# Patient Record
Sex: MALE | Race: WHITE | URBAN - METROPOLITAN AREA
[De-identification: names, ages, dates, MRNs, and addresses within clinical notes are randomized per-mention and may not be internally consistent; named-entity substitution may affect disease eponyms.]

---

## 2020-08-12 ENCOUNTER — LAB OUTSIDE AN ENCOUNTER (OUTPATIENT)
Dept: URBAN - METROPOLITAN AREA TELEHEALTH 2 | Facility: TELEHEALTH | Age: 70
End: 2020-08-12

## 2020-08-12 ENCOUNTER — OFFICE VISIT (OUTPATIENT)
Dept: URBAN - METROPOLITAN AREA TELEHEALTH 2 | Facility: TELEHEALTH | Age: 70
End: 2020-08-12
Payer: MEDICARE

## 2020-08-12 DIAGNOSIS — K51.00 CHRONIC ULCERATIVE ENTEROCOLITIS: ICD-10-CM

## 2020-08-12 DIAGNOSIS — R79.82 CRP ELEVATED: ICD-10-CM

## 2020-08-12 DIAGNOSIS — K21.9 GERD (GASTROESOPHAGEAL REFLUX DISEASE): ICD-10-CM

## 2020-08-12 PROCEDURE — 3017F COLORECTAL CA SCREEN DOC REV: CPT | Performed by: INTERNAL MEDICINE

## 2020-08-12 PROCEDURE — G8420 CALC BMI NORM PARAMETERS: HCPCS | Performed by: INTERNAL MEDICINE

## 2020-08-12 PROCEDURE — 1036F TOBACCO NON-USER: CPT | Performed by: INTERNAL MEDICINE

## 2020-08-12 PROCEDURE — G9622 NO UNHEAL ETOH USER: HCPCS | Performed by: INTERNAL MEDICINE

## 2020-08-12 PROCEDURE — G8482 FLU IMMUNIZE ORDER/ADMIN: HCPCS | Performed by: INTERNAL MEDICINE

## 2020-08-12 PROCEDURE — 99214 OFFICE O/P EST MOD 30 MIN: CPT | Performed by: INTERNAL MEDICINE

## 2020-08-12 PROCEDURE — G9903 PT SCRN TBCO ID AS NON USER: HCPCS | Performed by: INTERNAL MEDICINE

## 2020-08-12 PROCEDURE — G8427 DOCREV CUR MEDS BY ELIG CLIN: HCPCS | Performed by: INTERNAL MEDICINE

## 2020-08-12 RX ORDER — IRBESARTAN 150 MG/1
TAKE 1 TABLET (150 MG) BY ORAL ROUTE ONCE DAILY TABLET ORAL 1
Qty: 0 | Refills: 0 | Status: ACTIVE | COMMUNITY
Start: 1900-01-01 | End: 1900-01-01

## 2020-08-12 RX ORDER — BALSALAZIDE DISODIUM 750 MG/1
TAKE 9 CAPSULES BY ORAL ROUTE DAILY FOR 90 DAYS CAPSULE ORAL 1
Qty: 810 | Refills: 4 | Status: ACTIVE | COMMUNITY
Start: 2020-04-22 | End: 2021-07-16

## 2020-08-12 NOTE — HPI-OTHER HISTORIES
. Pt George is a 70 y/o individual with UC, currently on balsalazide (6 per day), here for f/u of his condition. . Patient is referred by Dr. Leila Rivera. . He was diagnosed with UC in 2007.  He was initially started on Lialda and then switched to balsalazide, both of which worked.  He was then on 6-MP, which caused low blood counts and discontinued in 2018 (took for about 2 weeks).    Has never been on steroids. . Previously on 12/20/2018, he reports 4 BM per day, semi-formed per day. He has no rectal bleeding.  He has occasional urgency, typically in the morning.  No abdominal pain.  Weight is steady.  We started on sulfasalazine. Previously on 2/1/2019, he reports that he had several side effects to sulfasalazine so stopped taking.  His BM is normal, no blood in stool.  Some heartburn. Previously on 4/9/2019, he has 4 BM per day, occasional urgency sxs.   Stopped taking zantac, and GERD has remained stable.  Takes medication as needed. . Previously on 10/14/2019, pt reports that he has 3 BM per day, formed in nature.  No urgency of BM.  Reflux under control. . Previously on 2/3/2020, pt reports that he has about 3 BM per day, no urgency.  No rectal bleeding.  Was found to have an inguinal hernia by his PCP, and is planned for surgical repair for this. . Previously on 4/22/2020, pt reports after his hernia surgery, he first had constipation and then diarrhea.  He started to see mucus in his stool as well.  In past 2 weeks, his BM has reduced to 3 (from 5).   .  Today on 8/12/2020, pt reports that he is back to normal; 2-3 BM per day, no mucus, nl stools.  Reflux is stable.  . Denies any joint pain.  Denies any rashes, eye pain or redness. . FH: Father with UC SH: ETOH, no cig; retired. . Labs 2/2020: hgb 13.4, wbc 7.7, CRP 5, ast 18, alt 10 10/2019: CRP 19, ESR 15, ast 20, Alt 15, Cr 0.8, Hgb 14.2 2/2019: CRP 17.5; ESR 33; Ast 22, Alt 13, Hgb 13.4 12/2018: ESR 32, CRP: 9.4, B12 644, TB-gold neg; Hep B neg; Ferritin 37, Hgb 14.1; Ast 26, Alt 15 6/2018: Hgb 13.4 . Colonoscopy 5/2019:  The transverse colon, hepatic flexure, ascending colon and cecum appeared normal.  Biopsies were taken with a cold forceps for histology.  Buchanan Score 0. The pathology specimen was placed into Bottle Number 2. An area of mildly erythematous mucosa was found in the descending colon.  This was biopsied with a cold forceps for histology.  Buchanan Score 0 to 1.  The pathology specimen was placed into Bottle Number 3.  - The transverse colon, hepatic flexure, ascending colon and cecum are normal.  Biopsied. - Erythematous mucosa in the descending colon.  Biopsied. - Erythematous mucosa in the rectum and in the recto-sigmoid colon.  Biopsied.  A. Gastric , Biopsy: -Mild chronic inactive gastritis.  -Alcian blue/PAS stain is negative for intestinal metaplasia.  -Giemsa stain is negative for Helicobacter pylori organisms; confirmed with immunohistochemical stain. B. Ascending Colon and Transverse Colon Ulcer, Biopsy: -Minimal architectural changes (see comment). -No evidence of active colitis. -No granulomas seen. -Negative for dysplasia or malignancy. C. Descending Colon Ulcer, Biopsy: -Minimal architectural changes (see comment). -No evidence of active colitis. -No granulomas seen. -Negative for dysplasia or malignancy. D. Recto-Sigmoid, Colon Ulcer, Biopsy: -Chronic focally active colitis, consistent with history of Ulcerative Colitis. -No granulomas, negative for dysplasia or malignancy. . 12/2018: The perianal and digital rectal examinations were normal. A diffuse area of mildly congested, erythematous, inflamed and vascular-pattern-decreased mucosa was found in the sigmoid colon and in the descending colon.  Biopsies were taken with a cold forceps for histology. The exam was otherwise without abnormality.  A. Descending Colon and Sigmoid Colon, Biopsy: -Moderate chronic active colitis, consistent with history of ulcerative colitis. -No pathogenic microorganisms, granulomas, or viral cytopathic effect. -No dysplasia or malignancy.  . 6/2018: Mild erythema in distal colon A. Random Colon , Biopsy: -Focal active colitis (see comment). -There is no evidence of dysplasia or malignancy.  . 11/2017: A. GE JUNCTION, BIOPSY: -Squamous mucosa with reflux associated changes. -Gastric cardia type mucosa with chronic inflammation and foveolar hyperplasia. -Alcian blue/PAS stain is negative for intestinal metaplasia. No fungus. -Giemsa stain is negative for Helicobacter pylori organisms. B. PROXIMAL COLON, BIOPSY: -Chronic active colitis, moderate, consistent with history of Ulcerative Colitis, see comment. -No granulomas, negative for dysplasia or malignancy. C. DISTAL COLON, BIOPSY: -Chronic active colitis, moderate, consistent with history of Ulcerative Colitis, see comment. -No granulomas, negative for dysplasia or malignancy. D. RECTUM, BIOPSY: -Chronic active colitis, moderate, consistent with history of Ulcerative Colitis, see comment. -No granulomas, negative for dysplasia or malignancy.   .

## 2020-08-12 NOTE — PHYSICAL EXAM NECK/THYROID:
Acute Care - Physical Therapy Initial Evaluation  [facility]     Patient Name: Akua Torres  : 1942  MRN: [id number]  Today's Date: 11/10/2017   Onset of Illness/Injury or Date of Surgery Date: 11/10/17  Date of Referral to PT: 11/10/17  Referring Physician: MD Donovan      Admit Date: 11/10/2017     Visit Dx:    ICD-10-CM ICD-9-CM   1. Impaired functional mobility, balance, gait, and endurance Z74.09 V49.89     Patient Active Problem List   Diagnosis   • Degenerative disc disease, lumbar   • Lumbar stenosis with neurogenic claudication   • History of lumbar fusion   • Spondylosis of lumbar region without myelopathy or radiculopathy   • Mild obesity   • Physical deconditioning   • Idiopathic gout   • Anxiety and depression   • Greater trochanteric bursitis of both hips   • Status post total bilateral knee replacement   • Back pain   • HTN (hypertension)   • HLD (hyperlipidemia)   • Prediabetes   • S/P lumbar spinal fusion   • Renal insufficiency     Past Medical History:   Diagnosis Date   • Anxiety    • Anxiety and depression    • Arthritis    • Elevated serum creatinine     SEES DR SMITH EVERY 6 MONTHS- NEPHROLOGIST    • Extremity pain    • GERD (gastroesophageal reflux disease)    • H/O bladder infections     JUST FINISHED MACROBID ON 17 FOR RECENT UTI   • Hypercholesteremia    • Hypertension    • Joint pain    • Low back pain    • Neck pain    • Rheumatic fever    • Wears glasses      Past Surgical History:   Procedure Laterality Date   • BACK SURGERY      LUMBAR PER DR THORNTON    • CARPAL TUNNEL RELEASE Left    • COLONOSCOPY     • FINGER SURGERY Right     3RD FINGER   • HEEL SPUR EXCISION Bilateral    • KNEE ARTHROSCOPY Bilateral    • REPLACEMENT TOTAL KNEE BILATERAL Bilateral           PT ASSESSMENT (last 72 hours)      PT Evaluation       11/10/17 1620 11/10/17 0718    Rehab Evaluation    Document Type evaluation  -MJ     Subjective Information agree to therapy;complains of;pain   normal appearance , without tenderness upon palpation , no deformities , trachea midline , Thyroid normal size , no thyroid nodules , no masses , no JVD , thyroid nontender -MJ     Patient Effort, Rehab Treatment good  -MJ     Symptoms Noted During/After Treatment fatigue  -MJ     General Information    Patient Profile Review yes  -MJ     Onset of Illness/Injury or Date of Surgery Date 11/10/17  -     Referring Physician MD Donovan  -MJ     General Observations Pt supine in bed, hemovac, lozano, IV, SCDs.  present  -MJ     Pertinent History Of Current Problem Pt presents for surgical management of back pain and dysfunction that failed to improve with conservative management  -MJ     Precautions/Limitations fall precautions;spinal precautions   hemovac  -MJ     Prior Level of Function min assist:;all household mobility;community mobility;gait;transfer;bed mobility   Pain increased with activity, RW for mobility  -MJ     Equipment Currently Used at Home walker, rolling;cane, straight;wheelchair;commode;shower chair  -MJ walker, rolling;cane, straight;wheelchair  -TS    Plans/Goals Discussed With patient and family;agreed upon  -MJ     Risks Reviewed patient and family:;LOB;increased discomfort  -MJ     Benefits Reviewed patient and family:;improve function;increase independence;increase strength;increase balance;decrease pain  -MJ     Barriers to Rehab previous functional deficit  -MJ     Living Environment    Lives With spouse  -MJ spouse  -TS    Living Arrangements house  -MJ house  -TS    Home Accessibility bed and bath on same level   walk-in shower  -MJ bed and bath on same level;no concerns  -TS    Stair Railings at Home  none  -TS    Type of Financial/Environmental Concern  none  -TS    Transportation Available  car;family or friend will provide  -TS    Clinical Impression    Date of Referral to PT 11/10/17  -MJ     PT Diagnosis s/p lumbar fusion  -MJ     Criteria for Skilled Therapeutic Interventions Met yes;treatment indicated  -MJ     Pain Assessment    Pain Assessment 0-10  -MJ     Pain Score 7  -MJ     Post Pain Score 0  -MJ     Pain Type Acute pain  -MJ     Pain  Location Back  -MJ     Pain Intervention(s) Repositioned;Ambulation/increased activity;Medication (See MAR)  -MJ     Cognitive Assessment/Intervention    Current Cognitive/Communication Assessment functional  -MJ     Orientation Status oriented x 4  -MJ     Follows Commands/Answers Questions 100% of the time;able to follow multi-step instructions;needs cueing  -MJ     Personal Safety mild impairment   cues for back precautions  -MJ     ROM (Range of Motion)    General ROM no range of motion deficits identified  -MJ     General ROM Detail for B LE; defer UE to OT  -MJ     MMT (Manual Muscle Testing)    General MMT Assessment lower extremity strength deficits identified  -MJ     Lower Extremity    Lower Ext Manual Muscle Testing Detail R LE grossly 4/5; L LE grossly 4+/5  -MJ     Bed Mobility, Assessment/Treatment    Bed Mobility, Assistive Device bed rails  -MJ     Bed Mob, Supine to Sit, [place] moderate assist (50% patient effort);verbal cues required  -MJ     Bed Mob, Sit to Supine, [place] minimum assist (75% patient effort);verbal cues required  -MJ     Bed Mobility, Safety Issues decreased use of arms for pushing/pulling;decreased use of legs for bridging/pushing  -MJ     Bed Mobility, Impairments strength decreased;pain  -MJ     Bed Mobility, Comment Pt performed log roll into/out of bed, verbal cues for sequencing. Assist with trunk for sidelying to sit and assist with legs for sit to sidelying  -MJ     Transfer Assessment/Treatment    Transfers, Sit-Stand [place] minimum assist (75% patient effort);verbal cues required  -MJ     Transfers, Stand-Sit [place] contact guard assist;verbal cues required  -MJ     Transfers, Sit-Stand-Sit, Assist Device rolling walker  -MJ     Transfer, Safety Issues sequencing ability decreased;step length decreased;weight-shifting ability decreased  -MJ     Transfer, Impairments strength decreased;pain  -MJ     Transfer, Comment Verbal cues to push up from bed  with UEs to stand and to reach back for bed to lower into sitting.  -     Gait Assessment/Treatment    Gait, [place] Level contact guard assist;verbal cues required  -     Gait, Assistive Device rolling walker  -     Gait, Distance (Feet) 120  -     Gait, Gait Pattern Analysis swing-through gait  -     Gait, Gait Deviations bilateral:;antalgic;nixon decreased;decreased heel strike;step length decreased;toe-to-floor clearance decreased;weight-shifting ability decreased  -     Gait, Safety Issues sequencing ability decreased;step length decreased;weight-shifting ability decreased  -     Gait, Impairments strength decreased;pain  -     Gait, Comment Pt demo step through gait pattern at very slow pace. Pt required frequent cues for upright posture and to clear feet from floor during swing. Cues to stay in middle of RW and to not twist back during turns. Mild improvement with cues for correction. Gait limited by pain and fatigue  -     Therapy Exercises    Bilateral Lower Extremities --   initiate POD #1  -     Sensory Assessment/Intervention    Light Touch --   denies numbness/tingling; can actively DF both ankles  -     Positioning and Restraints    Pre-Treatment Position in bed  -     Post Treatment Position bed  -MJ     In Bed notified nsg;supine;call light within reach;encouraged to call for assist;with family/caregiver;SCD pump applied  -       User Key  (r) = Recorded By, (t) = Taken By, (c) = Cosigned By    Initials Name Provider Type     Tona Block, RN Registered Nurse    CLAY Clements, SUSAN Physical Therapist          Physical Therapy Education     Title: PT OT SLP Therapies (Active)     Topic: Physical Therapy (Active)     Point: Mobility training (Done)    Learning Progress Summary    Learner Readiness Method Response Comment Documented by Status   Patient Acceptance ARPITA MCALLISTER,NR Reviewed back precautions, log roll, benefits of mobility  11/10/17 1789 Done   Significant  Other Acceptance ARPITA MCALLISTER NR Reviewed back precautions, log roll, benefits of mobility  11/10/17 1735 Done               Point: Body mechanics (Done)    Learning Progress Summary    Learner Readiness Method Response Comment Documented by Status   Patient Acceptance ARPITA MCALLISTERNR Reviewed back precautions, log roll, benefits of mobility  11/10/17 1735 Done   Significant Other Acceptance ARPITA MCALLISTERNR Reviewed back precautions, log roll, benefits of mobility  11/10/17 1735 Done               Point: Precautions (Done)    Learning Progress Summary    Learner Readiness Method Response Comment Documented by Status   Patient Acceptance ARPITA MCALLISTERNR Reviewed back precautions, log roll, benefits of mobility  11/10/17 1735 Done   Significant Other Acceptance ARPITA MCALLISTERNR Reviewed back precautions, log roll, benefits of mobility  11/10/17 1735 Done                      User Key     Initials Effective Dates Name Provider Type Discipline     03/14/16 -  Henrique Clements, PT Physical Therapist PT                PT Recommendation and Plan  Anticipated Discharge Disposition: home with assist, home with home health  Demonstrates Need for Referral to Another Service: home health care  Planned Therapy Interventions: balance training, bed mobility training, gait training, home exercise program, patient/family education, strengthening, transfer training  PT Frequency: daily  Plan of Care Review  Plan Of Care Reviewed With: patient  Outcome Summary/Follow up Plan: Pt ambulated 120 feet with CGA and RW, limited by fatigue. PT will follow to increase strength and progress functional mobility with spinal precautions. Plan is home with HHPT at discharge          IP PT Goals       11/10/17 1735          Bed Mobility PT LTG    Bed Mobility PT LTG, Date Established 11/10/17  -MJ      Bed Mobility PT LTG, Time to Achieve 5 days  -MJ      Bed Mobility PT LTG, Activity Type supine to sit/sit to supine   log roll  -MJ      Bed Mobility PT LTG, [place]  Level contact guard assist  -MJ      Bed Mobility PT LTG, Date Goal Reviewed 11/10/17  -MJ      Bed Mobility PT LTG, Outcome goal ongoing  -MJ      Transfer Training PT LTG    Transfer Training PT LTG, Date Established 11/10/17  -MJ      Transfer Training PT LTG, Time to Achieve 5 days  -MJ      Transfer Training PT LTG, Activity Type sit to stand/stand to sit  -MJ      Transfer Training PT LTG, [place] Level conditional independence  -MJ      Transfer Training PT LTG, Assist Device walker, rolling  -MJ      Transfer Training PT  LTG, Date Goal Reviewed 11/10/17  -MJ      Transfer Training PT LTG, Outcome goal ongoing  -MJ      Gait Training PT LTG    Gait Training Goal PT LTG, Date Established 11/10/17  -MJ      Gait Training Goal PT LTG, Time to Achieve 5 days  -MJ      Gait Training Goal PT LTG, [place] Level conditional independence  -MJ      Gait Training Goal PT LTG, Assist Device walker, rolling  -MJ      Gait Training Goal PT LTG, Distance to Achieve 500 feet  -MJ      Gait Training Goal PT LTG, Date Goal Reviewed 11/10/17  -MJ      Gait Training Goal PT LTG, Outcome goal ongoing  -MJ        User Key  (r) = Recorded By, (t) = Taken By, (c) = Cosigned By    Initials Name Provider Type    CLAY Clements, PT Physical Therapist                Outcome Measures       11/10/17 1620          How much help from another person do you currently need...    Turning from your back to your side while in flat bed without using bedrails? 3  -MJ      Moving from lying on back to sitting on the side of a flat bed without bedrails? 2  -MJ      Moving to and from a bed to a chair (including a wheelchair)? 3  -MJ      Standing up from a chair using your arms (e.g., wheelchair, bedside chair)? 3  -MJ      Climbing 3-5 steps with a railing? 2  -MJ      To walk in hospital room? 3  -MJ      AM-PAC 6 Clicks Score 16  -MJ      Functional Assessment    Outcome Measure Options AM-PAC 6 Clicks Basic Mobility (PT)  -MJ         User Key  (r) = Recorded By, (t) = Taken By, (c) = Cosigned By    Initials Name Provider Type    CLAY Clements PT Physical Therapist           Time Calculation:         PT Charges       11/10/17 1737          Time Calculation    Start Time 1620  -MJ      PT Received On 11/10/17  -MJ      PT Goal Re-Cert Due Date 11/20/17  -MJ      Time Calculation- PT    Total Timed Code Minutes- PT 10 minute(s)  -        User Key  (r) = Recorded By, (t) = Taken By, (c) = Cosigned By    Initials Name Provider Type    CLAY Clements PT Physical Therapist          Therapy Charges for Today     Code Description Service Date Service Provider Modifiers Qty    51342574655 HC PT EVAL MOD COMPLEXITY 3 11/10/2017 Henrique Clements, PT GP 1    [id number] HC GAIT TRAINING EA 15 MIN 11/10/2017 Henrique Clements, PT GP 1          PT G-Codes  Outcome Measure Options: AM-PAC 6 Clicks Basic Mobility (PT)      Henrique Clements PT  11/10/2017

## 2021-05-06 ENCOUNTER — WEB ENCOUNTER (OUTPATIENT)
Dept: URBAN - METROPOLITAN AREA CLINIC 98 | Facility: CLINIC | Age: 71
End: 2021-05-06

## 2021-05-07 ENCOUNTER — WEB ENCOUNTER (OUTPATIENT)
Dept: URBAN - METROPOLITAN AREA CLINIC 98 | Facility: CLINIC | Age: 71
End: 2021-05-07

## 2021-09-11 ENCOUNTER — WEB ENCOUNTER (OUTPATIENT)
Dept: URBAN - METROPOLITAN AREA CLINIC 98 | Facility: CLINIC | Age: 71
End: 2021-09-11

## 2021-09-11 RX ORDER — BALSALAZIDE DISODIUM 750 MG/1
9 CAPSULES CAPSULE ORAL QD
Qty: 810 CAPSULE | Refills: 4

## 2022-05-02 ENCOUNTER — WEB ENCOUNTER (OUTPATIENT)
Dept: URBAN - METROPOLITAN AREA CLINIC 98 | Facility: CLINIC | Age: 72
End: 2022-05-02

## 2022-09-26 ENCOUNTER — APPOINTMENT (RX ONLY)
Dept: URBAN - METROPOLITAN AREA OTHER 4 | Facility: OTHER | Age: 72
Setting detail: DERMATOLOGY
End: 2022-09-26

## 2022-09-26 DIAGNOSIS — D22 MELANOCYTIC NEVI: ICD-10-CM

## 2022-09-26 DIAGNOSIS — L57.0 ACTINIC KERATOSIS: ICD-10-CM

## 2022-09-26 DIAGNOSIS — L73.8 OTHER SPECIFIED FOLLICULAR DISORDERS: ICD-10-CM

## 2022-09-26 DIAGNOSIS — L82.1 OTHER SEBORRHEIC KERATOSIS: ICD-10-CM

## 2022-09-26 DIAGNOSIS — L82.0 INFLAMED SEBORRHEIC KERATOSIS: ICD-10-CM

## 2022-09-26 DIAGNOSIS — D18.0 HEMANGIOMA: ICD-10-CM

## 2022-09-26 DIAGNOSIS — L81.4 OTHER MELANIN HYPERPIGMENTATION: ICD-10-CM

## 2022-09-26 DIAGNOSIS — L91.8 OTHER HYPERTROPHIC DISORDERS OF THE SKIN: ICD-10-CM

## 2022-09-26 PROBLEM — D18.01 HEMANGIOMA OF SKIN AND SUBCUTANEOUS TISSUE: Status: ACTIVE | Noted: 2022-09-26

## 2022-09-26 PROBLEM — D48.5 NEOPLASM OF UNCERTAIN BEHAVIOR OF SKIN: Status: ACTIVE | Noted: 2022-09-26

## 2022-09-26 PROBLEM — D22.5 MELANOCYTIC NEVI OF TRUNK: Status: ACTIVE | Noted: 2022-09-26

## 2022-09-26 PROCEDURE — ? LIQUID NITROGEN

## 2022-09-26 PROCEDURE — 17003 DESTRUCT PREMALG LES 2-14: CPT

## 2022-09-26 PROCEDURE — 99203 OFFICE O/P NEW LOW 30 MIN: CPT | Mod: 25

## 2022-09-26 PROCEDURE — 11102 TANGNTL BX SKIN SINGLE LES: CPT

## 2022-09-26 PROCEDURE — ? BIOPSY BY SHAVE METHOD

## 2022-09-26 PROCEDURE — ? COUNSELING

## 2022-09-26 PROCEDURE — 17000 DESTRUCT PREMALG LESION: CPT | Mod: 59

## 2022-09-26 ASSESSMENT — LOCATION DETAILED DESCRIPTION DERM
LOCATION DETAILED: LEFT SUPERIOR FOREHEAD
LOCATION DETAILED: RIGHT MEDIAL FOREHEAD
LOCATION DETAILED: RIGHT SUPERIOR UPPER BACK
LOCATION DETAILED: LEFT SUPERIOR UPPER BACK
LOCATION DETAILED: RIGHT FOREHEAD
LOCATION DETAILED: LEFT MEDIAL FOREHEAD
LOCATION DETAILED: LEFT INFERIOR MEDIAL UPPER BACK
LOCATION DETAILED: LEFT AXILLARY VAULT
LOCATION DETAILED: LEFT FOREHEAD
LOCATION DETAILED: LEFT LATERAL ABDOMEN
LOCATION DETAILED: LEFT PROXIMAL POSTERIOR UPPER ARM
LOCATION DETAILED: RIGHT AXILLARY VAULT
LOCATION DETAILED: LEFT MEDIAL SUPERIOR EYELID

## 2022-09-26 ASSESSMENT — LOCATION ZONE DERM
LOCATION ZONE: FACE
LOCATION ZONE: EYELID
LOCATION ZONE: TRUNK
LOCATION ZONE: AXILLAE
LOCATION ZONE: ARM

## 2022-09-26 ASSESSMENT — LOCATION SIMPLE DESCRIPTION DERM
LOCATION SIMPLE: LEFT FOREHEAD
LOCATION SIMPLE: RIGHT AXILLARY VAULT
LOCATION SIMPLE: RIGHT UPPER BACK
LOCATION SIMPLE: RIGHT FOREHEAD
LOCATION SIMPLE: LEFT UPPER BACK
LOCATION SIMPLE: ABDOMEN
LOCATION SIMPLE: LEFT SUPERIOR EYELID
LOCATION SIMPLE: LEFT UPPER ARM
LOCATION SIMPLE: LEFT AXILLARY VAULT

## 2022-09-26 ASSESSMENT — PAIN INTENSITY VAS: HOW INTENSE IS YOUR PAIN 0 BEING NO PAIN, 10 BEING THE MOST SEVERE PAIN POSSIBLE?: NO PAIN

## 2022-09-26 NOTE — PROCEDURE: BIOPSY BY SHAVE METHOD
Body Location Override (Optional - Billing Will Still Be Based On Selected Body Map Location If Applicable): left upper arm
Detail Level: Detailed
Depth Of Biopsy: dermis
Was A Bandage Applied: Yes
Size Of Lesion In Cm: 1
X Size Of Lesion In Cm: 0
Anticipated Plan (Based On Presumed Biopsy Results): D&C
Biopsy Type: H and E
Biopsy Method: curette
Anesthesia Type: 1% lidocaine without epinephrine
Anesthesia Volume In Cc: 0.5
Hemostasis: Drysol
Wound Care: Polysporin ointment
Dressing: bandage
Destruction After The Procedure: No
Type Of Destruction Used: Curettage
Curettage Text: The wound bed was treated with curettage after the biopsy was performed.
Cryotherapy Text: The wound bed was treated with cryotherapy after the biopsy was performed.
Electrodesiccation Text: The wound bed was treated with electrodesiccation after the biopsy was performed.
Electrodesiccation And Curettage Text: The wound bed was treated with electrodesiccation and curettage after the biopsy was performed.
Silver Nitrate Text: The wound bed was treated with silver nitrate after the biopsy was performed.
Lab: [id number]
Lab Facility: [id number]
Consent: Written consent was obtained and risks were reviewed including but not limited to scarring, infection, bleeding, scabbing, incomplete removal, nerve damage and allergy to anesthesia.
Post-Care Instructions: I reviewed with the patient in detail post-care instructions. Patient is to keep the biopsy site dry overnight, and then apply bacitracin twice daily until healed. Patient may apply hydrogen peroxide soaks to remove any crusting.
Notification Instructions: Patient will be notified of biopsy results. However, patient instructed to call the office if not contacted within 2 weeks.
Billing Type: Third-Party Bill
Information: Selecting Yes will display possible errors in your note based on the variables you have selected. This validation is only offered as a suggestion for you. PLEASE NOTE THAT THE VALIDATION TEXT WILL BE REMOVED WHEN YOU FINALIZE YOUR NOTE. IF YOU WANT TO FAX A PRELIMINARY NOTE YOU WILL NEED TO TOGGLE THIS TO 'NO' IF YOU DO NOT WANT IT IN YOUR FAXED NOTE.

## 2022-11-20 ENCOUNTER — WEB ENCOUNTER (OUTPATIENT)
Dept: URBAN - METROPOLITAN AREA CLINIC 98 | Facility: CLINIC | Age: 72
End: 2022-11-20

## 2022-11-20 RX ORDER — BALSALAZIDE DISODIUM 750 MG/1
6 CAPSULES CAPSULE ORAL QD
Qty: 540 CAPSULE | Refills: 4

## 2022-11-22 ENCOUNTER — WEB ENCOUNTER (OUTPATIENT)
Dept: URBAN - METROPOLITAN AREA CLINIC 98 | Facility: CLINIC | Age: 72
End: 2022-11-22

## 2023-04-26 ENCOUNTER — WEB ENCOUNTER (OUTPATIENT)
Dept: URBAN - METROPOLITAN AREA CLINIC 96 | Facility: CLINIC | Age: 73
End: 2023-04-26

## 2023-05-02 ENCOUNTER — LAB OUTSIDE AN ENCOUNTER (OUTPATIENT)
Dept: URBAN - METROPOLITAN AREA CLINIC 96 | Facility: CLINIC | Age: 73
End: 2023-05-02

## 2023-05-02 ENCOUNTER — OFFICE VISIT (OUTPATIENT)
Dept: URBAN - METROPOLITAN AREA CLINIC 96 | Facility: CLINIC | Age: 73
End: 2023-05-02
Payer: MEDICARE

## 2023-05-02 VITALS
TEMPERATURE: 94.6 F | BODY MASS INDEX: 28.63 KG/M2 | HEART RATE: 99 BPM | WEIGHT: 200 LBS | SYSTOLIC BLOOD PRESSURE: 138 MMHG | HEIGHT: 70 IN | DIASTOLIC BLOOD PRESSURE: 78 MMHG

## 2023-05-02 DIAGNOSIS — Z09 FOLLOW UP: ICD-10-CM

## 2023-05-02 DIAGNOSIS — K51.00 CHRONIC ULCERATIVE ENTEROCOLITIS: ICD-10-CM

## 2023-05-02 DIAGNOSIS — R10.13 DYSPEPSIA: ICD-10-CM

## 2023-05-02 DIAGNOSIS — Z91.89 COLON CANCER HIGH RISK: ICD-10-CM

## 2023-05-02 DIAGNOSIS — K21.9 GERD (GASTROESOPHAGEAL REFLUX DISEASE): ICD-10-CM

## 2023-05-02 DIAGNOSIS — R79.82 CRP ELEVATED: ICD-10-CM

## 2023-05-02 PROCEDURE — 99214 OFFICE O/P EST MOD 30 MIN: CPT | Performed by: INTERNAL MEDICINE

## 2023-05-02 RX ORDER — BALSALAZIDE DISODIUM 750 MG/1
6 CAPSULES CAPSULE ORAL ONCE DAILY
Qty: 540 | Refills: 4 | OUTPATIENT
Start: 2023-05-02 | End: 2024-07-25

## 2023-05-02 RX ORDER — IRBESARTAN 150 MG/1
TAKE 1 TABLET (150 MG) BY ORAL ROUTE ONCE DAILY TABLET ORAL 1
Qty: 0 | Refills: 0 | Status: ACTIVE | COMMUNITY
Start: 1900-01-01

## 2023-05-02 RX ORDER — BALSALAZIDE DISODIUM 750 MG/1
6 CAPSULES CAPSULE ORAL QD
Qty: 540 CAPSULE | Refills: 4 | Status: DISCONTINUED | COMMUNITY

## 2023-05-02 NOTE — HPI-TODAY'S VISIT:
Pt George is a 73 y/o individual with UC, currently on balsalazide (6 per day), here for f/u of his condition. . Patient is referred by Dr. Leila Rivera. . He was diagnosed with UC in 2007.  He was initially started on Lialda and then switched to balsalazide, both of which worked.  He was then on 6-MP, which caused low blood counts and discontinued in 2018 (took for about 2 weeks).    Has never been on steroids. . Previously on 12/20/2018, he reports 4 BM per day, semi-formed per day. He has no rectal bleeding.  He has occasional urgency, typically in the morning.  No abdominal pain.  Weight is steady.  We started on sulfasalazine. Previously on 2/1/2019, he reports that he had several side effects to sulfasalazine so stopped taking.  His BM is normal, no blood in stool.  Some heartburn. Previously on 4/9/2019, he has 4 BM per day, occasional urgency sxs.   Stopped taking zantac, and GERD has remained stable.  Takes medication as needed. . Previously on 10/14/2019, pt reports that he has 3 BM per day, formed in nature.  No urgency of BM.  Reflux under control. . Previously on 2/3/2020, pt reports that he has about 3 BM per day, no urgency.  No rectal bleeding.  Was found to have an inguinal hernia by his PCP, and is planned for surgical repair for this. . Previously on 4/22/2020, pt reports after his hernia surgery, he first had constipation and then diarrhea.  He started to see mucus in his stool as well.  In past 2 weeks, his BM has reduced to 3 (from 5).   . Today on 8/12/2020, pt reports that he is back to normal; 2-3 BM per day, no mucus, nl stools.  Reflux is stable. . Today on 5/2/2023, pt reports 2-3 BM per day, no diarrhea, no blood.  Some incomplete emptying sxs.  No abd pain/n/v/rare gerd.  No weight loss.  . Denies any joint pain.  Denies any rashes, eye pain or redness. . FH: Father with UC SH: ETOH, no cig; retired. . Labs 2/2020: hgb 13.4, wbc 7.7, CRP 5, ast 18, alt 10 10/2019: CRP 19, ESR 15, ast 20, Alt 15, Cr 0.8, Hgb 14.2 2/2019: CRP 17.5; ESR 33; Ast 22, Alt 13, Hgb 13.4 12/2018: ESR 32, CRP: 9.4, B12 644, TB-gold neg; Hep B neg; Ferritin 37, Hgb 14.1; Ast 26, Alt 15 6/2018: Hgb 13.4 . Colonoscopy 5/2019:  The transverse colon, hepatic flexure, ascending colon and cecum appeared normal.  Biopsies were taken with a cold forceps for histology.  Buchanan Score 0. The pathology specimen was placed into Bottle Number 2. An area of mildly erythematous mucosa was found in the descending colon.  This was biopsied with a cold forceps for histology.  Buchanan Score 0 to 1.  The pathology specimen was placed into Bottle Number 3.  - The transverse colon, hepatic flexure, ascending colon and cecum are normal.  Biopsied. - Erythematous mucosa in the descending colon.  Biopsied. - Erythematous mucosa in the rectum and in the recto-sigmoid colon.  Biopsied.  A. Gastric , Biopsy: -Mild chronic inactive gastritis.  -Alcian blue/PAS stain is negative for intestinal metaplasia.  -Giemsa stain is negative for Helicobacter pylori organisms; confirmed with immunohistochemical stain. B. Ascending Colon and Transverse Colon Ulcer, Biopsy: -Minimal architectural changes (see comment). -No evidence of active colitis. -No granulomas seen. -Negative for dysplasia or malignancy. C. Descending Colon Ulcer, Biopsy: -Minimal architectural changes (see comment). -No evidence of active colitis. -No granulomas seen. -Negative for dysplasia or malignancy. D. Recto-Sigmoid, Colon Ulcer, Biopsy: -Chronic focally active colitis, consistent with history of Ulcerative Colitis. -No granulomas, negative for dysplasia or malignancy. . 12/2018: The perianal and digital rectal examinations were normal. A diffuse area of mildly congested, erythematous, inflamed and vascular-pattern-decreased mucosa was found in the sigmoid colon and in the descending colon.  Biopsies were taken with a cold forceps for histology. The exam was otherwise without abnormality.  A. Descending Colon and Sigmoid Colon, Biopsy: -Moderate chronic active colitis, consistent with history of ulcerative colitis. -No pathogenic microorganisms, granulomas, or viral cytopathic effect. -No dysplasia or malignancy.  . 6/2018: Mild erythema in distal colon A. Random Colon , Biopsy: -Focal active colitis (see comment). -There is no evidence of dysplasia or malignancy.  . 11/2017: A. GE JUNCTION, BIOPSY: -Squamous mucosa with reflux associated changes. -Gastric cardia type mucosa with chronic inflammation and foveolar hyperplasia. -Alcian blue/PAS stain is negative for intestinal metaplasia. No fungus. -Giemsa stain is negative for Helicobacter pylori organisms. B. PROXIMAL COLON, BIOPSY: -Chronic active colitis, moderate, consistent with history of Ulcerative Colitis, see comment. -No granulomas, negative for dysplasia or malignancy. C. DISTAL COLON, BIOPSY: -Chronic active colitis, moderate, consistent with history of Ulcerative Colitis, see comment. -No granulomas, negative for dysplasia or malignancy. D. RECTUM, BIOPSY: -Chronic active colitis, moderate, consistent with history of Ulcerative Colitis, see comment. -No granulomas, negative for dysplasia or malignancy.   .

## 2023-06-23 ENCOUNTER — WEB ENCOUNTER (OUTPATIENT)
Dept: URBAN - METROPOLITAN AREA SURGERY CENTER 18 | Facility: SURGERY CENTER | Age: 73
End: 2023-06-23

## 2023-06-29 ENCOUNTER — CLAIMS CREATED FROM THE CLAIM WINDOW (OUTPATIENT)
Dept: URBAN - METROPOLITAN AREA CLINIC 4 | Facility: CLINIC | Age: 73
End: 2023-06-29
Payer: MEDICARE

## 2023-06-29 ENCOUNTER — TELEPHONE ENCOUNTER (OUTPATIENT)
Dept: URBAN - METROPOLITAN AREA CLINIC 96 | Facility: CLINIC | Age: 73
End: 2023-06-29

## 2023-06-29 ENCOUNTER — OFFICE VISIT (OUTPATIENT)
Dept: URBAN - METROPOLITAN AREA SURGERY CENTER 18 | Facility: SURGERY CENTER | Age: 73
End: 2023-06-29
Payer: MEDICARE

## 2023-06-29 ENCOUNTER — LAB OUTSIDE AN ENCOUNTER (OUTPATIENT)
Dept: URBAN - METROPOLITAN AREA CLINIC 96 | Facility: CLINIC | Age: 73
End: 2023-06-29

## 2023-06-29 DIAGNOSIS — K51.00 ACUTE ULCERATIVE PANCOLITIS: ICD-10-CM

## 2023-06-29 DIAGNOSIS — K31.89 OTHER DISEASES OF STOMACH AND DUODENUM: ICD-10-CM

## 2023-06-29 DIAGNOSIS — K21.00 ALKALINE REFLUX ESOPHAGITIS: ICD-10-CM

## 2023-06-29 DIAGNOSIS — K29.01 OTHER ACUTE GASTRITIS WITH HEMORRHAGE: ICD-10-CM

## 2023-06-29 DIAGNOSIS — K29.00 ACUTE GASTRITIS WITHOUT BLEEDING: ICD-10-CM

## 2023-06-29 PROCEDURE — 43239 EGD BIOPSY SINGLE/MULTIPLE: CPT | Performed by: INTERNAL MEDICINE

## 2023-06-29 PROCEDURE — 88312 SPECIAL STAINS GROUP 1: CPT | Performed by: PATHOLOGY

## 2023-06-29 PROCEDURE — 88305 TISSUE EXAM BY PATHOLOGIST: CPT | Performed by: PATHOLOGY

## 2023-06-29 PROCEDURE — 45380 COLONOSCOPY AND BIOPSY: CPT | Performed by: INTERNAL MEDICINE

## 2023-06-29 PROCEDURE — G8907 PT DOC NO EVENTS ON DISCHARG: HCPCS | Performed by: INTERNAL MEDICINE

## 2023-06-29 RX ORDER — BALSALAZIDE DISODIUM 750 MG/1
6 CAPSULES CAPSULE ORAL ONCE DAILY
Qty: 540 | Refills: 4 | Status: ACTIVE | COMMUNITY
Start: 2023-05-02 | End: 2024-07-25

## 2023-06-29 RX ORDER — PANTOPRAZOLE SODIUM 40 MG/1
1 TABLET TABLET, DELAYED RELEASE ORAL EVERY 12 HOURS
Qty: 60 TABLET | Refills: 11 | OUTPATIENT
Start: 2023-06-29

## 2023-06-29 RX ORDER — IRBESARTAN 150 MG/1
TAKE 1 TABLET (150 MG) BY ORAL ROUTE ONCE DAILY TABLET ORAL 1
Qty: 0 | Refills: 0 | Status: ACTIVE | COMMUNITY
Start: 1900-01-01

## 2023-07-01 ENCOUNTER — WEB ENCOUNTER (OUTPATIENT)
Dept: URBAN - METROPOLITAN AREA CLINIC 98 | Facility: CLINIC | Age: 73
End: 2023-07-01

## 2023-07-01 RX ORDER — PANTOPRAZOLE SODIUM 40 MG/1
1 TABLET TABLET, DELAYED RELEASE ORAL EVERY 12 HOURS
Qty: 60 TABLET | Refills: 11
Start: 2023-06-29

## 2023-07-18 ENCOUNTER — WEB ENCOUNTER (OUTPATIENT)
Dept: URBAN - METROPOLITAN AREA CLINIC 98 | Facility: CLINIC | Age: 73
End: 2023-07-18

## 2023-07-27 ENCOUNTER — WEB ENCOUNTER (OUTPATIENT)
Dept: URBAN - METROPOLITAN AREA CLINIC 96 | Facility: CLINIC | Age: 73
End: 2023-07-27

## 2023-08-01 ENCOUNTER — OFFICE VISIT (OUTPATIENT)
Dept: URBAN - METROPOLITAN AREA CLINIC 96 | Facility: CLINIC | Age: 73
End: 2023-08-01
Payer: MEDICARE

## 2023-08-01 DIAGNOSIS — R10.13 DYSPEPSIA: ICD-10-CM

## 2023-08-01 DIAGNOSIS — Z91.89 COLON CANCER HIGH RISK: ICD-10-CM

## 2023-08-01 DIAGNOSIS — Z09 FOLLOW UP: ICD-10-CM

## 2023-08-01 DIAGNOSIS — K21.9 GERD (GASTROESOPHAGEAL REFLUX DISEASE): ICD-10-CM

## 2023-08-01 DIAGNOSIS — K51.00 CHRONIC ULCERATIVE ENTEROCOLITIS: ICD-10-CM

## 2023-08-01 DIAGNOSIS — R79.82 CRP ELEVATED: ICD-10-CM

## 2023-08-01 PROCEDURE — 99214 OFFICE O/P EST MOD 30 MIN: CPT | Performed by: INTERNAL MEDICINE

## 2023-08-01 RX ORDER — BALSALAZIDE DISODIUM 750 MG/1
6 CAPSULES CAPSULE ORAL ONCE DAILY
Qty: 540 | Refills: 4 | Status: ACTIVE | COMMUNITY
Start: 2023-05-02 | End: 2024-07-25

## 2023-08-01 RX ORDER — BALSALAZIDE DISODIUM 750 MG/1
6 CAPSULES CAPSULE ORAL ONCE DAILY
Qty: 540 | Refills: 4 | OUTPATIENT

## 2023-08-01 RX ORDER — IRBESARTAN 150 MG/1
TAKE 1 TABLET (150 MG) BY ORAL ROUTE ONCE DAILY TABLET ORAL 1
Qty: 0 | Refills: 0 | Status: ACTIVE | COMMUNITY
Start: 1900-01-01

## 2023-08-01 RX ORDER — PANTOPRAZOLE SODIUM 40 MG/1
1 TABLET TABLET, DELAYED RELEASE ORAL EVERY 12 HOURS
Qty: 60 TABLET | Refills: 11 | Status: ACTIVE | COMMUNITY
Start: 2023-06-29

## 2023-08-01 NOTE — HPI-TODAY'S VISIT:
Pt George is a 73 y/o individual with UC, currently on balsalazide (6 per day), here for f/u of his condition. . Patient is referred by Dr. Leila Rivera. . He was diagnosed with UC in 2007.  He was initially started on Lialda and then switched to balsalazide, both of which worked.  He was then on 6-MP, which caused low blood counts and discontinued in 2018 (took for about 2 weeks).    Has never been on steroids. . Previously on 12/20/2018, he reports 4 BM per day, semi-formed per day. He has no rectal bleeding.  He has occasional urgency, typically in the morning.  No abdominal pain.  Weight is steady.  We started on sulfasalazine. Previously on 2/1/2019, he reports that he had several side effects to sulfasalazine so stopped taking.  His BM is normal, no blood in stool.  Some heartburn. Previously on 4/9/2019, he has 4 BM per day, occasional urgency sxs.   Stopped taking zantac, and GERD has remained stable.  Takes medication as needed. . Previously on 10/14/2019, pt reports that he has 3 BM per day, formed in nature.  No urgency of BM.  Reflux under control. . Previously on 2/3/2020, pt reports that he has about 3 BM per day, no urgency.  No rectal bleeding.  Was found to have an inguinal hernia by his PCP, and is planned for surgical repair for this. . Previously on 4/22/2020, pt reports after his hernia surgery, he first had constipation and then diarrhea.  He started to see mucus in his stool as well.  In past 2 weeks, his BM has reduced to 3 (from 5).   . Today on 8/12/2020, pt reports that he is back to normal; 2-3 BM per day, no mucus, nl stools.  Reflux is stable. . Today on 5/2/2023, pt reports 2-3 BM per day, no diarrhea, no blood.  Some incomplete emptying sxs.  No abd pain/n/v/rare gerd.  No weight loss. . Today on 8/1/2023, pt reports  . Denies any joint pain.  Denies any rashes, eye pain or redness. . FH: Father with UC SH: ETOH, no cig; retired. . Labs 2/2020: hgb 13.4, wbc 7.7, CRP 5, ast 18, alt 10 10/2019: CRP 19, ESR 15, ast 20, Alt 15, Cr 0.8, Hgb 14.2 2/2019: CRP 17.5; ESR 33; Ast 22, Alt 13, Hgb 13.4 12/2018: ESR 32, CRP: 9.4, B12 644, TB-gold neg; Hep B neg; Ferritin 37, Hgb 14.1; Ast 26, Alt 15 6/2018: Hgb 13.4 . 6/2023: The examined duodenum was normal. Biopsies were taken with a cold forceps for histology. The pathology specimen was placed into Bottle Number 1. One cratered gastric ulcer with pigmented material was found in the gastric antrum. The lesion was 8 mm in largest dimension. Biopsies were taken with a cold forceps for histology. The pathology specimen was placed into Bottle Number 2. LA Grade B (one or more mucosal breaks greater than 5 mm, not extending between the tops of two mucosal folds) esophagitis with no bleeding was found 35 cm from the incisors. The examined esophagus was normal. . The ascending colon appeared normal. Biopsies were taken with a cold forceps for histology. The pathology specimen was placed into Bottle Number 3. The transverse colon appeared normal. Biopsies were taken with a cold forceps for histology. The pathology specimen was placed into Bottle Number 4. The descending colon appeared normal. Biopsies were taken with a cold forceps for histology. The pathology specimen was placed into Bottle Number 5. The sigmoid colon appeared normal. Biopsies were taken with a cold forceps for histology. The pathology specimen was placed into Bottle Number 6. An area of mildly erythematous mucosa was found in the rectum. The pathology specimen was placed into Bottle Number 7. The exam was otherwise without abnormality on direct and retroflexion views. . (A) Duodenum, Biopsy: NO SIGNIFICANT ABNORMALITY. (B) Stomach, Biopsy: ACUTE EROSIVE / HEMORRHAGIC GASTRITIS. See Comment No Evidence of H. Pylori Organisms or Intestinal Metaplasia. Negative For Dysplasia or Malignancy. COMMENT: The possible etiologic agents are alcohol or NSAID ingestion. Less likely, these histologic changes might be seen in ischemic gastritis. (C) Colon, Ascending, Biopsy: NO SIGNIFICANT ABNORMALITY. (D) Colon, Transverse, Biopsy: NO SIGNIFICANT ABNORMALITY. (E) Colon, Descending, Biopsy: NO SIGNIFICANT ABNORMALITY. (F) Colon, Sigmoid, Biopsy: PATCHY MILD CHRONIC INACTIVE COLITIS, CONSISTENT WITH QUIESCENT ULCERATIVE COLITIS. See Comment. Negative for Infectious Organisms, Dysplasia or Malignancy. (G) Rectum, Biopsy: PATCHY MILD CHRONIC INACTIVE COLITIS, CONSISTENT WITH QUIESCENT ULCERATIVE COLITIS. See Comment. Negative for Infectious Organisms, Dysplasia or Malignancy. COMMENT: The biopsies show patchy chronic inactive colitis with a universal involvement of the rectum and absence of granulomas, supporting a diagnosis of quiescent ulcerative colitis. The presence of histological features of chronicity excludes infectious colitis. . Colonoscopy 5/2019:  The transverse colon, hepatic flexure, ascending colon and cecum appeared normal.  Biopsies were taken with a cold forceps for histology.  Buchanan Score 0. The pathology specimen was placed into Bottle Number 2. An area of mildly erythematous mucosa was found in the descending colon.  This was biopsied with a cold forceps for histology.  Buchanan Score 0 to 1.  The pathology specimen was placed into Bottle Number 3.  - The transverse colon, hepatic flexure, ascending colon and cecum are normal.  Biopsied. - Erythematous mucosa in the descending colon.  Biopsied. - Erythematous mucosa in the rectum and in the recto-sigmoid colon.  Biopsied.  A. Gastric , Biopsy: -Mild chronic inactive gastritis.  -Alcian blue/PAS stain is negative for intestinal metaplasia.  -Giemsa stain is negative for Helicobacter pylori organisms; confirmed with immunohistochemical stain. B. Ascending Colon and Transverse Colon Ulcer, Biopsy: -Minimal architectural changes (see comment). -No evidence of active colitis. -No granulomas seen. -Negative for dysplasia or malignancy. C. Descending Colon Ulcer, Biopsy: -Minimal architectural changes (see comment). -No evidence of active colitis. -No granulomas seen. -Negative for dysplasia or malignancy. D. Recto-Sigmoid, Colon Ulcer, Biopsy: -Chronic focally active colitis, consistent with history of Ulcerative Colitis. -No granulomas, negative for dysplasia or malignancy. . 12/2018: The perianal and digital rectal examinations were normal. A diffuse area of mildly congested, erythematous, inflamed and vascular-pattern-decreased mucosa was found in the sigmoid colon and in the descending colon.  Biopsies were taken with a cold forceps for histology. The exam was otherwise without abnormality.  A. Descending Colon and Sigmoid Colon, Biopsy: -Moderate chronic active colitis, consistent with history of ulcerative colitis. -No pathogenic microorganisms, granulomas, or viral cytopathic effect. -No dysplasia or malignancy.  . 6/2018: Mild erythema in distal colon A. Random Colon , Biopsy: -Focal active colitis (see comment). -There is no evidence of dysplasia or malignancy.  . 11/2017: A. GE JUNCTION, BIOPSY: -Squamous mucosa with reflux associated changes. -Gastric cardia type mucosa with chronic inflammation and foveolar hyperplasia. -Alcian blue/PAS stain is negative for intestinal metaplasia. No fungus. -Giemsa stain is negative for Helicobacter pylori organisms. B. PROXIMAL COLON, BIOPSY: -Chronic active colitis, moderate, consistent with history of Ulcerative Colitis, see comment. -No granulomas, negative for dysplasia or malignancy. C. DISTAL COLON, BIOPSY: -Chronic active colitis, moderate, consistent with history of Ulcerative Colitis, see comment. -No granulomas, negative for dysplasia or malignancy. D. RECTUM, BIOPSY: -Chronic active colitis, moderate, consistent with history of Ulcerative Colitis, see comment. -No granulomas, negative for dysplasia or malignancy.   .

## 2023-08-22 ENCOUNTER — WEB ENCOUNTER (OUTPATIENT)
Dept: URBAN - METROPOLITAN AREA CLINIC 96 | Facility: CLINIC | Age: 73
End: 2023-08-22

## 2023-08-22 ENCOUNTER — WEB ENCOUNTER (OUTPATIENT)
Dept: URBAN - METROPOLITAN AREA SURGERY CENTER 18 | Facility: SURGERY CENTER | Age: 73
End: 2023-08-22

## 2023-08-28 ENCOUNTER — OFFICE VISIT (OUTPATIENT)
Dept: URBAN - METROPOLITAN AREA SURGERY CENTER 18 | Facility: SURGERY CENTER | Age: 73
End: 2023-08-28
Payer: MEDICARE

## 2023-08-28 ENCOUNTER — CLAIMS CREATED FROM THE CLAIM WINDOW (OUTPATIENT)
Dept: URBAN - METROPOLITAN AREA CLINIC 4 | Facility: CLINIC | Age: 73
End: 2023-08-28
Payer: MEDICARE

## 2023-08-28 DIAGNOSIS — K29.70 GASTRITIS, UNSPECIFIED, WITHOUT BLEEDING: ICD-10-CM

## 2023-08-28 DIAGNOSIS — R12 BURNING REFLUX: ICD-10-CM

## 2023-08-28 DIAGNOSIS — K22.89 OTHER SPECIFIED DISEASE OF ESOPHAGUS: ICD-10-CM

## 2023-08-28 DIAGNOSIS — K31.89 OTHER DISEASES OF STOMACH AND DUODENUM: ICD-10-CM

## 2023-08-28 PROCEDURE — 88342 IMHCHEM/IMCYTCHM 1ST ANTB: CPT | Performed by: PATHOLOGY

## 2023-08-28 PROCEDURE — 88305 TISSUE EXAM BY PATHOLOGIST: CPT | Performed by: PATHOLOGY

## 2023-08-28 PROCEDURE — 43239 EGD BIOPSY SINGLE/MULTIPLE: CPT | Performed by: INTERNAL MEDICINE

## 2023-08-28 PROCEDURE — G8907 PT DOC NO EVENTS ON DISCHARG: HCPCS | Performed by: INTERNAL MEDICINE

## 2023-08-28 RX ORDER — PANTOPRAZOLE SODIUM 40 MG/1
1 TABLET TABLET, DELAYED RELEASE ORAL EVERY 12 HOURS
Qty: 60 TABLET | Refills: 11 | Status: ACTIVE | COMMUNITY
Start: 2023-06-29

## 2023-08-28 RX ORDER — IRBESARTAN 150 MG/1
TAKE 1 TABLET (150 MG) BY ORAL ROUTE ONCE DAILY TABLET ORAL 1
Qty: 0 | Refills: 0 | Status: ACTIVE | COMMUNITY
Start: 1900-01-01

## 2023-08-28 RX ORDER — BALSALAZIDE DISODIUM 750 MG/1
6 CAPSULES CAPSULE ORAL ONCE DAILY
Qty: 540 | Refills: 4 | Status: ACTIVE | COMMUNITY

## 2023-09-30 ENCOUNTER — WEB ENCOUNTER (OUTPATIENT)
Dept: URBAN - METROPOLITAN AREA CLINIC 96 | Facility: CLINIC | Age: 73
End: 2023-09-30

## 2023-12-13 ENCOUNTER — DASHBOARD ENCOUNTERS (OUTPATIENT)
Age: 73
End: 2023-12-13

## 2023-12-13 ENCOUNTER — CLAIMS CREATED FROM THE CLAIM WINDOW (OUTPATIENT)
Dept: URBAN - METROPOLITAN AREA TELEHEALTH 2 | Facility: TELEHEALTH | Age: 73
End: 2023-12-13
Payer: MEDICARE

## 2023-12-13 DIAGNOSIS — K51.00 CHRONIC ULCERATIVE ENTEROCOLITIS: ICD-10-CM

## 2023-12-13 DIAGNOSIS — Z91.89 COLON CANCER HIGH RISK: ICD-10-CM

## 2023-12-13 DIAGNOSIS — R79.82 CRP ELEVATED: ICD-10-CM

## 2023-12-13 DIAGNOSIS — R10.13 DYSPEPSIA: ICD-10-CM

## 2023-12-13 DIAGNOSIS — Z09 FOLLOW UP: ICD-10-CM

## 2023-12-13 DIAGNOSIS — K21.9 GERD (GASTROESOPHAGEAL REFLUX DISEASE): ICD-10-CM

## 2023-12-13 PROCEDURE — 99214 OFFICE O/P EST MOD 30 MIN: CPT | Performed by: INTERNAL MEDICINE

## 2023-12-13 RX ORDER — BALSALAZIDE DISODIUM 750 MG/1
6 CAPSULES CAPSULE ORAL ONCE DAILY
Qty: 540 | Refills: 4 | Status: ACTIVE | COMMUNITY

## 2023-12-13 RX ORDER — IRBESARTAN 150 MG/1
TAKE 1 TABLET (150 MG) BY ORAL ROUTE ONCE DAILY TABLET ORAL 1
Qty: 0 | Refills: 0 | Status: ACTIVE | COMMUNITY
Start: 1900-01-01

## 2023-12-13 RX ORDER — PANTOPRAZOLE SODIUM 40 MG/1
1 TABLET TABLET, DELAYED RELEASE ORAL EVERY 12 HOURS
Qty: 180 TABLET | Refills: 4 | OUTPATIENT
Start: 2023-12-13

## 2023-12-13 RX ORDER — PANTOPRAZOLE SODIUM 40 MG/1
1 TABLET TABLET, DELAYED RELEASE ORAL EVERY 12 HOURS
Qty: 60 TABLET | Refills: 11 | Status: ACTIVE | COMMUNITY
Start: 2023-06-29

## 2023-12-13 RX ORDER — BALSALAZIDE DISODIUM 750 MG/1
6 CAPSULES CAPSULE ORAL ONCE DAILY
Qty: 540 | Refills: 4 | OUTPATIENT

## 2023-12-13 NOTE — HPI-TODAY'S VISIT:
Pt George is a 74 y/o individual with UC, currently on balsalazide (6 per day), here for f/u of his condition. . Patient is referred by Dr. Leila Rivera. . He was diagnosed with UC in 2007.  He was initially started on Lialda and then switched to balsalazide, both of which worked.  He was then on 6-MP, which caused low blood counts and discontinued in 2018 (took for about 2 weeks).    Has never been on steroids. . Previously on 12/20/2018, he reports 4 BM per day, semi-formed per day. He has no rectal bleeding.  He has occasional urgency, typically in the morning.  No abdominal pain.  Weight is steady.  We started on sulfasalazine. Previously on 2/1/2019, he reports that he had several side effects to sulfasalazine so stopped taking.  His BM is normal, no blood in stool.  Some heartburn. Previously on 4/9/2019, he has 4 BM per day, occasional urgency sxs.   Stopped taking zantac, and GERD has remained stable.  Takes medication as needed. . Previously on 10/14/2019, pt reports that he has 3 BM per day, formed in nature.  No urgency of BM.  Reflux under control. . Previously on 2/3/2020, pt reports that he has about 3 BM per day, no urgency.  No rectal bleeding.  Was found to have an inguinal hernia by his PCP, and is planned for surgical repair for this. . Previously on 4/22/2020, pt reports after his hernia surgery, he first had constipation and then diarrhea.  He started to see mucus in his stool as well.  In past 2 weeks, his BM has reduced to 3 (from 5).   . Previously on 8/12/2020, pt reports that he is back to normal; 2-3 BM per day, no mucus, nl stools.  Reflux is stable. . Previously on 5/2/2023, pt reports 2-3 BM per day, no diarrhea, no blood.  Some incomplete emptying sxs.  No abd pain/n/v/rare gerd.  No weight loss. . Today on 12/13/2023, pt reports he is feeling good overall.  His repeat EGD on 8/2023 showed healing of large gastric ulcer.  No abd pain/n/v/c.  . Denies any joint pain.  Denies any rashes, eye pain or redness. . FH: Father with UC SH: ETOH, no cig; retired. . Labs 2/2020: hgb 13.4, wbc 7.7, CRP 5, ast 18, alt 10 10/2019: CRP 19, ESR 15, ast 20, Alt 15, Cr 0.8, Hgb 14.2 2/2019: CRP 17.5; ESR 33; Ast 22, Alt 13, Hgb 13.4 12/2018: ESR 32, CRP: 9.4, B12 644, TB-gold neg; Hep B neg; Ferritin 37, Hgb 14.1; Ast 26, Alt 15 6/2018: Hgb 13.4 . 8/2023: (A) Stomach, Biopsy:MILD CHEMICAL/REACTIVE GASTROPATHY.No Evidence of H. Pylori Organisms or Intestinal Metaplasia.Negative for Dysplasia or Malignancy.(B) Gastroesophageal Junction, Biopsy:GASTRIC-TYPE MUCOSA WITHOUT SIGNIFICANT ABNORMALITY; NO SQUAMOUS MUCOSAIDENTIFIED.No Evidence of Infectious Organisms or Eosinophilic Esophagitis.Negative For Dysplasia or Malignancy. . 6/2023: The examined duodenum was normal. Biopsies were taken with a cold forceps for histology. The pathology specimen was placed into Bottle Number 1. One cratered gastric ulcer with pigmented material was found in the gastric antrum. The lesion was 8 mm in largest dimension. Biopsies were taken with a cold forceps for histology. The pathology specimen was placed into Bottle Number 2. LA Grade B (one or more mucosal breaks greater than 5 mm, not extending between the tops of two mucosal folds) esophagitis with no bleeding was found 35 cm from the incisors. The examined esophagus was normal. . The ascending colon appeared normal. Biopsies were taken with a cold forceps for histology. The pathology specimen was placed into Bottle Number 3. The transverse colon appeared normal. Biopsies were taken with a cold forceps for histology. The pathology specimen was placed into Bottle Number 4. The descending colon appeared normal. Biopsies were taken with a cold forceps for histology. The pathology specimen was placed into Bottle Number 5. The sigmoid colon appeared normal. Biopsies were taken with a cold forceps for histology. The pathology specimen was placed into Bottle Number 6. An area of mildly erythematous mucosa was found in the rectum. The pathology specimen was placed into Bottle Number 7. The exam was otherwise without abnormality on direct and retroflexion views. . (A) Duodenum, Biopsy: NO SIGNIFICANT ABNORMALITY. (B) Stomach, Biopsy: ACUTE EROSIVE / HEMORRHAGIC GASTRITIS. See Comment No Evidence of H. Pylori Organisms or Intestinal Metaplasia. Negative For Dysplasia or Malignancy. COMMENT: The possible etiologic agents are alcohol or NSAID ingestion. Less likely, these histologic changes might be seen in ischemic gastritis. (C) Colon, Ascending, Biopsy: NO SIGNIFICANT ABNORMALITY. (D) Colon, Transverse, Biopsy: NO SIGNIFICANT ABNORMALITY. (E) Colon, Descending, Biopsy: NO SIGNIFICANT ABNORMALITY. (F) Colon, Sigmoid, Biopsy: PATCHY MILD CHRONIC INACTIVE COLITIS, CONSISTENT WITH QUIESCENT ULCERATIVE COLITIS. See Comment. Negative for Infectious Organisms, Dysplasia or Malignancy. (G) Rectum, Biopsy: PATCHY MILD CHRONIC INACTIVE COLITIS, CONSISTENT WITH QUIESCENT ULCERATIVE COLITIS. See Comment. Negative for Infectious Organisms, Dysplasia or Malignancy. COMMENT: The biopsies show patchy chronic inactive colitis with a universal involvement of the rectum and absence of granulomas, supporting a diagnosis of quiescent ulcerative colitis. The presence of histological features of chronicity excludes infectious colitis. . Colonoscopy 5/2019:  The transverse colon, hepatic flexure, ascending colon and cecum appeared normal.  Biopsies were taken with a cold forceps for histology.  Buchanan Score 0. The pathology specimen was placed into Bottle Number 2. An area of mildly erythematous mucosa was found in the descending colon.  This was biopsied with a cold forceps for histology.  Buchanan Score 0 to 1.  The pathology specimen was placed into Bottle Number 3.  - The transverse colon, hepatic flexure, ascending colon and cecum are normal.  Biopsied. - Erythematous mucosa in the descending colon.  Biopsied. - Erythematous mucosa in the rectum and in the recto-sigmoid colon.  Biopsied.  A. Gastric , Biopsy: -Mild chronic inactive gastritis.  -Alcian blue/PAS stain is negative for intestinal metaplasia.  -Giemsa stain is negative for Helicobacter pylori organisms; confirmed with immunohistochemical stain. B. Ascending Colon and Transverse Colon Ulcer, Biopsy: -Minimal architectural changes (see comment). -No evidence of active colitis. -No granulomas seen. -Negative for dysplasia or malignancy. C. Descending Colon Ulcer, Biopsy: -Minimal architectural changes (see comment). -No evidence of active colitis. -No granulomas seen. -Negative for dysplasia or malignancy. D. Recto-Sigmoid, Colon Ulcer, Biopsy: -Chronic focally active colitis, consistent with history of Ulcerative Colitis. -No granulomas, negative for dysplasia or malignancy. . 12/2018: The perianal and digital rectal examinations were normal. A diffuse area of mildly congested, erythematous, inflamed and vascular-pattern-decreased mucosa was found in the sigmoid colon and in the descending colon.  Biopsies were taken with a cold forceps for histology. The exam was otherwise without abnormality.  A. Descending Colon and Sigmoid Colon, Biopsy: -Moderate chronic active colitis, consistent with history of ulcerative colitis. -No pathogenic microorganisms, granulomas, or viral cytopathic effect. -No dysplasia or malignancy.  . 6/2018: Mild erythema in distal colon A. Random Colon , Biopsy: -Focal active colitis (see comment). -There is no evidence of dysplasia or malignancy.  . 11/2017: A. GE JUNCTION, BIOPSY: -Squamous mucosa with reflux associated changes. -Gastric cardia type mucosa with chronic inflammation and foveolar hyperplasia. -Alcian blue/PAS stain is negative for intestinal metaplasia. No fungus. -Giemsa stain is negative for Helicobacter pylori organisms. B. PROXIMAL COLON, BIOPSY: -Chronic active colitis, moderate, consistent with history of Ulcerative Colitis, see comment. -No granulomas, negative for dysplasia or malignancy. C. DISTAL COLON, BIOPSY: -Chronic active colitis, moderate, consistent with history of Ulcerative Colitis, see comment. -No granulomas, negative for dysplasia or malignancy. D. RECTUM, BIOPSY: -Chronic active colitis, moderate, consistent with history of Ulcerative Colitis, see comment. -No granulomas, negative for dysplasia or malignancy.   .

## 2024-05-15 ENCOUNTER — WEB ENCOUNTER (OUTPATIENT)
Dept: URBAN - METROPOLITAN AREA CLINIC 98 | Facility: CLINIC | Age: 74
End: 2024-05-15

## 2024-06-24 ENCOUNTER — OFFICE VISIT (OUTPATIENT)
Dept: URBAN - METROPOLITAN AREA TELEHEALTH 2 | Facility: TELEHEALTH | Age: 74
End: 2024-06-24
Payer: MEDICARE

## 2024-06-24 ENCOUNTER — LAB OUTSIDE AN ENCOUNTER (OUTPATIENT)
Dept: URBAN - METROPOLITAN AREA TELEHEALTH 2 | Facility: TELEHEALTH | Age: 74
End: 2024-06-24

## 2024-06-24 DIAGNOSIS — K51.00 CHRONIC ULCERATIVE ENTEROCOLITIS: ICD-10-CM

## 2024-06-24 DIAGNOSIS — R10.13 DYSPEPSIA: ICD-10-CM

## 2024-06-24 DIAGNOSIS — R79.82 CRP ELEVATED: ICD-10-CM

## 2024-06-24 DIAGNOSIS — K21.9 GERD (GASTROESOPHAGEAL REFLUX DISEASE): ICD-10-CM

## 2024-06-24 PROCEDURE — 99213 OFFICE O/P EST LOW 20 MIN: CPT | Performed by: INTERNAL MEDICINE

## 2024-06-24 RX ORDER — PANTOPRAZOLE SODIUM 40 MG/1
1 TABLET TABLET, DELAYED RELEASE ORAL EVERY 12 HOURS
Qty: 60 TABLET | Refills: 11 | COMMUNITY
Start: 2023-06-29

## 2024-06-24 RX ORDER — IRBESARTAN 150 MG/1
TAKE 1 TABLET (150 MG) BY ORAL ROUTE ONCE DAILY TABLET ORAL 1
Qty: 0 | Refills: 0 | COMMUNITY
Start: 1900-01-01

## 2024-06-24 RX ORDER — BALSALAZIDE DISODIUM 750 MG/1
6 CAPSULES CAPSULE ORAL ONCE DAILY
Qty: 540 | Refills: 4 | OUTPATIENT

## 2024-06-24 RX ORDER — PANTOPRAZOLE SODIUM 40 MG/1
1 TABLET TABLET, DELAYED RELEASE ORAL EVERY 12 HOURS
Qty: 180 TABLET | Refills: 4 | COMMUNITY
Start: 2023-12-13

## 2024-06-24 RX ORDER — PANTOPRAZOLE SODIUM 40 MG/1
1 TABLET TABLET, DELAYED RELEASE ORAL EVERY 12 HOURS
Qty: 180 TABLET | Refills: 4 | OUTPATIENT

## 2024-06-24 RX ORDER — BALSALAZIDE DISODIUM 750 MG/1
6 CAPSULES CAPSULE ORAL ONCE DAILY
Qty: 540 | Refills: 4 | COMMUNITY

## 2024-06-24 NOTE — HPI-TODAY'S VISIT:
Pt George is a 74 y/o individual with UC, currently on balsalazide (6 per day), here for f/u of his condition. . Patient is referred by Dr. Leila Rivera. . He was diagnosed with UC in 2007.  He was initially started on Lialda and then switched to balsalazide, both of which worked.  He was then on 6-MP, which caused low blood counts and discontinued in 2018 (took for about 2 weeks).    Has never been on steroids. . Previously on 12/20/2018, he reports 4 BM per day, semi-formed per day. He has no rectal bleeding.  He has occasional urgency, typically in the morning.  No abdominal pain.  Weight is steady.  We started on sulfasalazine. Previously on 2/1/2019, he reports that he had several side effects to sulfasalazine so stopped taking.  His BM is normal, no blood in stool.  Some heartburn. Previously on 4/9/2019, he has 4 BM per day, occasional urgency sxs.   Stopped taking zantac, and GERD has remained stable.  Takes medication as needed. . Previously on 10/14/2019, pt reports that he has 3 BM per day, formed in nature.  No urgency of BM.  Reflux under control. . Previously on 2/3/2020, pt reports that he has about 3 BM per day, no urgency.  No rectal bleeding.  Was found to have an inguinal hernia by his PCP, and is planned for surgical repair for this. . Previously on 4/22/2020, pt reports after his hernia surgery, he first had constipation and then diarrhea.  He started to see mucus in his stool as well.  In past 2 weeks, his BM has reduced to 3 (from 5).   . Previously on 8/12/2020, pt reports that he is back to normal; 2-3 BM per day, no mucus, nl stools.  Reflux is stable. . Previously on 5/2/2023, pt reports 2-3 BM per day, no diarrhea, no blood.  Some incomplete emptying sxs.  No abd pain/n/v/rare gerd.  No weight loss. . Previously on 12/13/2023, pt reports he is feeling good overall.  His repeat EGD on 8/2023 showed healing of large gastric ulcer.  No abd pain/n/v/c. . Today on 6/24/2024, attempted to wean off of PPI, but then had some flares, took some TUMS, cough returned, restarted protonix 20mg and then sxs got better. . Denies any joint pain.  Denies any rashes, eye pain or redness. . FH: Father with UC SH: ETOH, no cig; retired. . Labs 2/2020: hgb 13.4, wbc 7.7, CRP 5, ast 18, alt 10 10/2019: CRP 19, ESR 15, ast 20, Alt 15, Cr 0.8, Hgb 14.2 2/2019: CRP 17.5; ESR 33; Ast 22, Alt 13, Hgb 13.4 12/2018: ESR 32, CRP: 9.4, B12 644, TB-gold neg; Hep B neg; Ferritin 37, Hgb 14.1; Ast 26, Alt 15 6/2018: Hgb 13.4 . 8/2023: (A) Stomach, Biopsy:MILD CHEMICAL/REACTIVE GASTROPATHY.No Evidence of H. Pylori Organisms or Intestinal Metaplasia.Negative for Dysplasia or Malignancy.(B) Gastroesophageal Junction, Biopsy:GASTRIC-TYPE MUCOSA WITHOUT SIGNIFICANT ABNORMALITY; NO SQUAMOUS MUCOSAIDENTIFIED.No Evidence of Infectious Organisms or Eosinophilic Esophagitis.Negative For Dysplasia or Malignancy. . 6/2023: The examined duodenum was normal. Biopsies were taken with a cold forceps for histology. The pathology specimen was placed into Bottle Number 1. One cratered gastric ulcer with pigmented material was found in the gastric antrum. The lesion was 8 mm in largest dimension. Biopsies were taken with a cold forceps for histology. The pathology specimen was placed into Bottle Number 2. LA Grade B (one or more mucosal breaks greater than 5 mm, not extending between the tops of two mucosal folds) esophagitis with no bleeding was found 35 cm from the incisors. The examined esophagus was normal. . The ascending colon appeared normal. Biopsies were taken with a cold forceps for histology. The pathology specimen was placed into Bottle Number 3. The transverse colon appeared normal. Biopsies were taken with a cold forceps for histology. The pathology specimen was placed into Bottle Number 4. The descending colon appeared normal. Biopsies were taken with a cold forceps for histology. The pathology specimen was placed into Bottle Number 5. The sigmoid colon appeared normal. Biopsies were taken with a cold forceps for histology. The pathology specimen was placed into Bottle Number 6. An area of mildly erythematous mucosa was found in the rectum. The pathology specimen was placed into Bottle Number 7. The exam was otherwise without abnormality on direct and retroflexion views. . (A) Duodenum, Biopsy: NO SIGNIFICANT ABNORMALITY. (B) Stomach, Biopsy: ACUTE EROSIVE / HEMORRHAGIC GASTRITIS. See Comment No Evidence of H. Pylori Organisms or Intestinal Metaplasia. Negative For Dysplasia or Malignancy. COMMENT: The possible etiologic agents are alcohol or NSAID ingestion. Less likely, these histologic changes might be seen in ischemic gastritis. (C) Colon, Ascending, Biopsy: NO SIGNIFICANT ABNORMALITY. (D) Colon, Transverse, Biopsy: NO SIGNIFICANT ABNORMALITY. (E) Colon, Descending, Biopsy: NO SIGNIFICANT ABNORMALITY. (F) Colon, Sigmoid, Biopsy: PATCHY MILD CHRONIC INACTIVE COLITIS, CONSISTENT WITH QUIESCENT ULCERATIVE COLITIS. See Comment. Negative for Infectious Organisms, Dysplasia or Malignancy. (G) Rectum, Biopsy: PATCHY MILD CHRONIC INACTIVE COLITIS, CONSISTENT WITH QUIESCENT ULCERATIVE COLITIS. See Comment. Negative for Infectious Organisms, Dysplasia or Malignancy. COMMENT: The biopsies show patchy chronic inactive colitis with a universal involvement of the rectum and absence of granulomas, supporting a diagnosis of quiescent ulcerative colitis. The presence of histological features of chronicity excludes infectious colitis. . Colonoscopy 5/2019:  The transverse colon, hepatic flexure, ascending colon and cecum appeared normal.  Biopsies were taken with a cold forceps for histology.  Buchanan Score 0. The pathology specimen was placed into Bottle Number 2. An area of mildly erythematous mucosa was found in the descending colon.  This was biopsied with a cold forceps for histology.  Buchanan Score 0 to 1.  The pathology specimen was placed into Bottle Number 3.  - The transverse colon, hepatic flexure, ascending colon and cecum are normal.  Biopsied. - Erythematous mucosa in the descending colon.  Biopsied. - Erythematous mucosa in the rectum and in the recto-sigmoid colon.  Biopsied.  A. Gastric , Biopsy: -Mild chronic inactive gastritis.  -Alcian blue/PAS stain is negative for intestinal metaplasia.  -Giemsa stain is negative for Helicobacter pylori organisms; confirmed with immunohistochemical stain. B. Ascending Colon and Transverse Colon Ulcer, Biopsy: -Minimal architectural changes (see comment). -No evidence of active colitis. -No granulomas seen. -Negative for dysplasia or malignancy. C. Descending Colon Ulcer, Biopsy: -Minimal architectural changes (see comment). -No evidence of active colitis. -No granulomas seen. -Negative for dysplasia or malignancy. D. Recto-Sigmoid, Colon Ulcer, Biopsy: -Chronic focally active colitis, consistent with history of Ulcerative Colitis. -No granulomas, negative for dysplasia or malignancy. . 12/2018: The perianal and digital rectal examinations were normal. A diffuse area of mildly congested, erythematous, inflamed and vascular-pattern-decreased mucosa was found in the sigmoid colon and in the descending colon.  Biopsies were taken with a cold forceps for histology. The exam was otherwise without abnormality.  A. Descending Colon and Sigmoid Colon, Biopsy: -Moderate chronic active colitis, consistent with history of ulcerative colitis. -No pathogenic microorganisms, granulomas, or viral cytopathic effect. -No dysplasia or malignancy.  . 6/2018: Mild erythema in distal colon A. Random Colon , Biopsy: -Focal active colitis (see comment). -There is no evidence of dysplasia or malignancy.  . 11/2017: A. GE JUNCTION, BIOPSY: -Squamous mucosa with reflux associated changes. -Gastric cardia type mucosa with chronic inflammation and foveolar hyperplasia. -Alcian blue/PAS stain is negative for intestinal metaplasia. No fungus. -Giemsa stain is negative for Helicobacter pylori organisms. B. PROXIMAL COLON, BIOPSY: -Chronic active colitis, moderate, consistent with history of Ulcerative Colitis, see comment. -No granulomas, negative for dysplasia or malignancy. C. DISTAL COLON, BIOPSY: -Chronic active colitis, moderate, consistent with history of Ulcerative Colitis, see comment. -No granulomas, negative for dysplasia or malignancy. D. RECTUM, BIOPSY: -Chronic active colitis, moderate, consistent with history of Ulcerative Colitis, see comment. -No granulomas, negative for dysplasia or malignancy.   .

## 2025-06-23 ENCOUNTER — WEB ENCOUNTER (OUTPATIENT)
Dept: URBAN - METROPOLITAN AREA CLINIC 98 | Facility: CLINIC | Age: 75
End: 2025-06-23

## 2025-06-24 ENCOUNTER — LAB OUTSIDE AN ENCOUNTER (OUTPATIENT)
Dept: URBAN - METROPOLITAN AREA CLINIC 98 | Facility: CLINIC | Age: 75
End: 2025-06-24

## 2025-08-08 ENCOUNTER — CLAIMS CREATED FROM THE CLAIM WINDOW (OUTPATIENT)
Dept: URBAN - METROPOLITAN AREA SURGERY CENTER 18 | Facility: SURGERY CENTER | Age: 75
End: 2025-08-08
Payer: MEDICARE

## 2025-08-08 ENCOUNTER — CLAIMS CREATED FROM THE CLAIM WINDOW (OUTPATIENT)
Dept: URBAN - METROPOLITAN AREA CLINIC 4 | Facility: CLINIC | Age: 75
End: 2025-08-08
Payer: MEDICARE

## 2025-08-08 ENCOUNTER — CLAIMS CREATED FROM THE CLAIM WINDOW (OUTPATIENT)
Dept: URBAN - METROPOLITAN AREA SURGERY CENTER 18 | Facility: SURGERY CENTER | Age: 75
End: 2025-08-08

## 2025-08-08 DIAGNOSIS — K29.70 GASTRITIS, UNSPECIFIED, WITHOUT BLEEDING: ICD-10-CM

## 2025-08-08 DIAGNOSIS — K63.5 SESSILE COLONIC POLYP: ICD-10-CM

## 2025-08-08 DIAGNOSIS — K31.89 OTHER DISEASES OF STOMACH AND DUODENUM: ICD-10-CM

## 2025-08-08 DIAGNOSIS — K29.70 GASTRITIS WITHOUT BLEEDING, UNSPECIFIED CHRONICITY, UNSPECIFIED GASTRITIS TYPE: ICD-10-CM

## 2025-08-08 DIAGNOSIS — K51.80 OTHER ULCERATIVE COLITIS WITHOUT COMPLICATIONS: ICD-10-CM

## 2025-08-08 DIAGNOSIS — K52.89 OTHER SPECIFIED NONINFECTIVE GASTROENTERITIS AND COLITIS: ICD-10-CM

## 2025-08-08 DIAGNOSIS — K51.00 CHRONIC ULCERATIVE ENTEROCOLITIS: ICD-10-CM

## 2025-08-08 DIAGNOSIS — K51.40 INFLAMMATORY POLYPS OF COLON WITHOUT COMPLICATIONS: ICD-10-CM

## 2025-08-08 DIAGNOSIS — K51.40 INFLAMMATORY FIBROID POLYP OF LARGE INTESTINE: ICD-10-CM

## 2025-08-08 PROCEDURE — 45380 COLONOSCOPY AND BIOPSY: CPT | Performed by: INTERNAL MEDICINE

## 2025-08-08 PROCEDURE — 00811 ANES LWR INTST NDSC NOS: CPT | Performed by: NURSE ANESTHETIST, CERTIFIED REGISTERED

## 2025-08-08 PROCEDURE — 45385 COLONOSCOPY W/LESION REMOVAL: CPT | Performed by: INTERNAL MEDICINE

## 2025-08-08 PROCEDURE — 43239 EGD BIOPSY SINGLE/MULTIPLE: CPT | Performed by: INTERNAL MEDICINE

## 2025-08-08 PROCEDURE — 88342 IMHCHEM/IMCYTCHM 1ST ANTB: CPT | Performed by: PATHOLOGY

## 2025-08-08 PROCEDURE — 88305 TISSUE EXAM BY PATHOLOGIST: CPT | Performed by: PATHOLOGY

## 2025-08-08 RX ORDER — PANTOPRAZOLE SODIUM 40 MG/1
1 TABLET TABLET, DELAYED RELEASE ORAL EVERY 12 HOURS
Qty: 60 TABLET | Refills: 11 | COMMUNITY
Start: 2023-06-29

## 2025-08-08 RX ORDER — PANTOPRAZOLE SODIUM 40 MG/1
1 TABLET TABLET, DELAYED RELEASE ORAL EVERY 12 HOURS
Qty: 180 TABLET | Refills: 4 | Status: ACTIVE | COMMUNITY

## 2025-08-08 RX ORDER — IRBESARTAN 150 MG/1
TAKE 1 TABLET (150 MG) BY ORAL ROUTE ONCE DAILY TABLET ORAL 1
Qty: 0 | Refills: 0 | COMMUNITY
Start: 1900-01-01

## 2025-08-08 RX ORDER — BALSALAZIDE DISODIUM 750 MG/1
6 CAPSULES CAPSULE ORAL ONCE DAILY
Qty: 540 | Refills: 4 | Status: ACTIVE | COMMUNITY